# Patient Record
Sex: FEMALE | Race: WHITE
[De-identification: names, ages, dates, MRNs, and addresses within clinical notes are randomized per-mention and may not be internally consistent; named-entity substitution may affect disease eponyms.]

---

## 2019-10-19 ENCOUNTER — HOSPITAL ENCOUNTER (EMERGENCY)
Dept: HOSPITAL 52 - LL.ED | Age: 74
Discharge: HOME | End: 2019-10-19
Payer: MEDICARE

## 2019-10-19 DIAGNOSIS — F02.80: ICD-10-CM

## 2019-10-19 DIAGNOSIS — E78.00: ICD-10-CM

## 2019-10-19 DIAGNOSIS — G30.9: ICD-10-CM

## 2019-10-19 DIAGNOSIS — E87.6: ICD-10-CM

## 2019-10-19 DIAGNOSIS — E83.42: ICD-10-CM

## 2019-10-19 DIAGNOSIS — Z79.899: ICD-10-CM

## 2019-10-19 DIAGNOSIS — R00.2: Primary | ICD-10-CM

## 2019-10-19 DIAGNOSIS — Z79.82: ICD-10-CM

## 2019-10-19 DIAGNOSIS — I10: ICD-10-CM

## 2019-10-19 DIAGNOSIS — F41.9: ICD-10-CM

## 2019-10-19 DIAGNOSIS — F17.210: ICD-10-CM

## 2019-10-19 LAB
CHLORIDE SERPL-SCNC: 107 MMOL/L (ref 98–107)
SODIUM SERPL-SCNC: 145 MMOL/L (ref 136–145)

## 2019-10-19 PROCEDURE — 93005 ELECTROCARDIOGRAM TRACING: CPT

## 2019-10-19 PROCEDURE — 99285 EMERGENCY DEPT VISIT HI MDM: CPT

## 2019-10-19 PROCEDURE — 85025 COMPLETE CBC W/AUTO DIFF WBC: CPT

## 2019-10-19 PROCEDURE — 71046 X-RAY EXAM CHEST 2 VIEWS: CPT

## 2019-10-19 PROCEDURE — 80053 COMPREHEN METABOLIC PANEL: CPT

## 2019-10-19 PROCEDURE — 36415 COLL VENOUS BLD VENIPUNCTURE: CPT

## 2019-10-19 PROCEDURE — 84484 ASSAY OF TROPONIN QUANT: CPT

## 2019-10-19 PROCEDURE — 85379 FIBRIN DEGRADATION QUANT: CPT

## 2019-10-19 PROCEDURE — 83880 ASSAY OF NATRIURETIC PEPTIDE: CPT

## 2019-10-19 PROCEDURE — 36000 PLACE NEEDLE IN VEIN: CPT

## 2019-10-19 PROCEDURE — 83735 ASSAY OF MAGNESIUM: CPT

## 2019-10-19 NOTE — EDM.PDOC
ED HPI GENERAL MEDICAL PROBLEM





- General


Chief Complaint: Cardiovascular Problem


Stated Complaint: "Feels like my heartbeat is irregular"


Time Seen by Provider: 10/19/19 15:04


Source of Information: Reports: Patient, Family


History Limitations: Reports: Other (patient has memory impairment)





- History of Present Illness


INITIAL COMMENTS - FREE TEXT/NARRATIVE: 





Patient complained to  today that she was not feeling well.  No specific 

complaints other than she felt that her heart was pounding.  She also felt it 

could have been a little fast at one point.  observed that patient may 

have been a bit more out of breath when running around house, but otherwise no 

other complaints/symptoms. 





Patient denies other changes.  No recent med changes.  No new supplements. She 

has been feeling well up until episode.  She now feels improved once in the ER. 





- Related Data


 Allergies











Allergy/AdvReac Type Severity Reaction Status Date / Time


 


No Known Allergies Allergy   Verified 10/19/19 15:48











Home Meds: 


 Home Meds





Ascorbic Acid [Vitamin C] 500 mg PO Q2D 10/19/19 [History]


Aspirin [Halfprin] 81 mg PO DAILY 10/19/19 [History]


Calcium Carbonate [Calcium] 1 tab PO DAILY 10/19/19 [History]


Cyanocobalamin (Vitamin B12) [Vitamin B12] 500 mcg PO DAILY 10/19/19 [History]


Donepezil HCl [Aricept] 0.5 tab PO BID 10/19/19 [History]


FLUoxetine [PROzac] 40 mg PO DAILY 10/19/19 [History]


Ferrous Sulfate 0.5 tab PO Q2D 10/19/19 [History]


Lisinopril 40 mg PO DAILY 10/19/19 [History]


Potassium Chloride 1 tab PO DAILY 10/19/19 [History]


Simvastatin 20 mg PO BEDTIME 10/19/19 [History]


hydroCHLOROthiazide [Hydrochlorothiazide] 12.5 mg PO DAILY 10/19/19 [History]











Past Medical History


Cardiovascular History: Reports: High Cholesterol, Hypertension


Neurological History: Reports: Alzheimers Disease


Psychiatric History: Reports: Anxiety





Social & Family History





- Tobacco Use


Smoking Status *Q: Current Every Day Smoker


Years of Tobacco use: 60


Packs/Tins Daily: 1





- Caffeine Use


Caffeine Use: Reports: Coffee





- Recreational Drug Use


Recreational Drug Use: No





ED ROS GENERAL





- Review of Systems


Review Of Systems: See Below


Constitutional: Reports: No Symptoms


HEENT: Reports: No Symptoms


Respiratory: Reports: No Symptoms


Cardiovascular: Reports: Palpitations.  Denies: Chest Pain, Edema, 

Lightheadedness, Syncope


GI/Abdominal: Reports: No Symptoms


: Reports: No Symptoms


Musculoskeletal: Reports: No Symptoms


Skin: Reports: No Symptoms


Neurological: Reports: No Symptoms


Psychiatric: Reports: No Symptoms


Hematologic/Lymphatic: Reports: No Symptoms





ED EXAM, GENERAL





- Physical Exam


Exam: See Below


Exam Limited By: No Limitations


General Appearance: Alert, WD/WN, No Apparent Distress


Eye Exam: Bilateral Eye: EOMI, PERRL


Ears: Normal External Exam


Nose: Normal Inspection


Throat/Mouth: Normal Inspection


Head: Atraumatic, Normocephalic


Neck: Normal Inspection, Supple, Non-Tender, Full Range of Motion.  No: Carotid 

Bruit


Respiratory/Chest: No Respiratory Distress, Lungs Clear, Normal Breath Sounds, 

No Accessory Muscle Use


Cardiovascular: Normal Peripheral Pulses, Regular Rate, Rhythm, No Edema, No 

Murmur


GI/Abdominal: Normal Bowel Sounds, Soft, Non-Tender, No Distention, No Abnormal 

Bruit, No Mass


 (Female) Exam: Deferred


Rectal (Female) Exam: Deferred


Back Exam: Normal Inspection


Extremities: Normal Inspection, Non-Tender, Normal Capillary Refill


Neurological: Alert, Oriented, No Motor/Sensory Deficits


Psychiatric: Normal Affect, Normal Mood


Skin Exam: Warm, Dry, Intact, Normal Color





EKG INTERPRETATION


EKG Date: 10/19/19


Time: 15:11


Rhythm: NSR


Rate (Beats/Min): 62


Axis: Normal


P-Wave: Present


QRS: Normal


ST-T: Other (Flattened appearance of T wave anterior leads)


QT: Normal


Comparison: NA - No Prior EKG





Course





- Vital Signs


Last Recorded V/S: 


 Last Vital Signs











Temp  36.8 C   10/19/19 15:50


 


Pulse  67   10/19/19 16:12


 


Resp  20   10/19/19 16:12


 


BP  136/97 H  10/19/19 16:12


 


Pulse Ox  97   10/19/19 16:12














- Orders/Labs/Meds


Orders: 


 Active Orders 24 hr











 Category Date Time Status


 


 EKG Documentation Completion [RC] ASDIRECTED Care  10/19/19 15:05 Active


 


 Chest 2V [CR] Stat Exams  10/19/19 15:05 Taken


 


 UA W/MICROSCOPIC [URIN] Stat Lab  10/19/19 15:04 Ordered


 


 EKG 12 Lead [EK] Stat Ther  10/19/19 15:05 Ordered











Labs: 


 Laboratory Tests











  10/19/19 10/19/19 10/19/19 Range/Units





  15:15 15:15 15:15 


 


WBC  5.0    (4.0-10.2)  K/uL


 


RBC  4.71    (3.77-5.09)  M/uL


 


Hgb  13.4    (11.7-15.5)  g/dL


 


Hct  41.2    (34.0-46.0)  %


 


MCV  87.5    (84.0-98.0)  fL


 


MCH  28.5    (28.2-33.3)  pg


 


MCHC  32.5    (31.7-36.0)  g/dL


 


RDW  14.8 H    (11.2-14.1)  %


 


Plt Count  202    (150-350)  K/uL


 


Neut % (Auto)  67.8    (45.0-80.0)  %


 


Lymph % (Auto)  15.2    (10.0-50.0)  %


 


Mono % (Auto)  13.0    (2.0-14.0)  %


 


Eos % (Auto)  3.2    (0.0-5.0)  %


 


Baso % (Auto)  0.8    (0.0-2.0)  %


 


Neut # (Auto)  3.40    (1.40-7.00)  K/uL


 


Lymph # (Auto)  0.76    (0.50-3.50)  K/uL


 


Mono # (Auto)  0.65    (0.00-1.00)  K/uL


 


Eos # (Auto)  0.16    (0.00-0.50)  K/uL


 


Baso # (Auto)  0.04    (0.00-0.20)  K/uL


 


D-Dimer, Quantitative    368  (0-400)  ng/mL


 


Sodium   145   (136-145)  mmol/L


 


Potassium   3.0 L   (3.5-5.1)  mmol/L


 


Chloride   107   ()  mmol/L


 


Carbon Dioxide   26.0   (21.0-32.0)  mmol/L


 


BUN   13   (7-18)  mg/dL


 


Creatinine   0.82   (0.51-1.17)  mg/dL


 


Est Cr Clr Drug Dosing   TNP   


 


Estimated GFR (MDRD)   > 60   mL/min


 


Glucose   99   ()  mg/dL


 


Calcium   9.4   (8.5-10.1)  mg/dL


 


Magnesium   1.7 L   (1.8-2.4)  mg/dL


 


Total Bilirubin   0.2   (0.2-1.0)  mg/dL


 


AST   30   (15-37)  U/L


 


ALT   36   (12-78)  U/L


 


Alkaline Phosphatase   68   ()  IU/L


 


Troponin I   0.000   (0.000-0.056)  ng/mL


 


NT-Pro-B Natriuret Pep   257 H   (0-125)  pg/mL


 


Total Protein   6.8   (6.4-8.2)  g/dL


 


Albumin   3.4   (3.4-5.0)  g/dL











Meds: 


Medications














Discontinued Medications














Generic Name Dose Route Start Last Admin





  Trade Name Freq  PRN Reason Stop Dose Admin


 


Magnesium Oxide  800 mg  10/19/19 16:17  





  Magnesium Oxide  PO  10/19/19 16:18  





  ONETIME ONE   





     





     





     





     


 


Potassium Chloride  40 meq  10/19/19 16:18  





  Klor-Con M20  PO  10/19/19 16:19  





  ONETIME ONE   





     





     





     





     














- Re-Assessments/Exams


Free Text/Narrative Re-Assessment/Exam: 











Patient placed on monitor,  EKG performed.  Normal sinus rhythm noted. Chest 

xray unremarkable. Cardiac labs drawn and were overall unremarkable except for 

low potassium and magnesium.  





No recurrence of initial complaint throughout ER stay. Patient comfortable.  

Did get anxious when her  left briefly, BP noted to up significantly.  

This improved significantly once  returned. 





Supplemental K and Mg given. Patient wished to go home. Plan for increased 

supplemental K and Mg discussed with patient's . Close follow up with 

primary provider for rechecks.  Precautions reviewed. To follow up as needed if 

symptoms return. 





Departure





- Departure


Time of Disposition: 16:18


Disposition: Home, Self-Care 01


Condition: Good


Clinical Impression: 


 Pounding heartbeat, Hypomagnesemia, Hypokalemia





Instructions:  Hypomagnesemia, Hypokalemia, Palpitations, Easy-to-Read


Referrals: 


Nivia Oliveira PA-C [Primary Care Provider] - 


Forms:  ED Department Discharge


Additional Instructions: 


See if the symptoms return.  If so, get them rechecked.





Increase the potassium to 2 10mg tabs tomorrow morning,  again at noon, and 

again at night. 


Take 2 tabs again on Monday morning.  See if you can get your clinic to recheck 

the potassium level for you on Monday.  If not, return to ER on Tuesday and we 

can do it as an outpatient order. 





Start Magnesium.  Try to get Magnesium Glycinate or Magnesium Taurate and take 

around 500mg daily. This is an over the counter medicine and likely is most 

easily located on an internet search.   You can try Magnesium Oxide, but it is 

not as well absorbed. 





Given that the Potassium level is so low, you will likely need to change to 

20mg daily from the current 10mg and have it rechecked periodically to make 

certain it is still within good range. 





- My Orders


Last 24 Hours: 


My Active Orders





10/19/19 15:04


UA W/MICROSCOPIC [URIN] Stat 





10/19/19 15:05


EKG Documentation Completion [RC] ASDIRECTED 


Chest 2V [CR] Stat 


EKG 12 Lead [EK] Stat 














- Assessment/Plan


Last 24 Hours: 


My Active Orders





10/19/19 15:04


UA W/MICROSCOPIC [URIN] Stat 





10/19/19 15:05


EKG Documentation Completion [RC] ASDIRECTED 


Chest 2V [CR] Stat 


EKG 12 Lead [EK] Stat

## 2020-05-18 ENCOUNTER — HOSPITAL ENCOUNTER (OUTPATIENT)
Dept: HOSPITAL 52 - LL.ED | Age: 75
Setting detail: OBSERVATION
Discharge: HOME | End: 2020-05-18
Attending: EMERGENCY MEDICINE | Admitting: FAMILY MEDICINE
Payer: MEDICARE

## 2020-05-18 DIAGNOSIS — F32.9: ICD-10-CM

## 2020-05-18 DIAGNOSIS — F41.9: ICD-10-CM

## 2020-05-18 DIAGNOSIS — F02.80: ICD-10-CM

## 2020-05-18 DIAGNOSIS — G30.9: ICD-10-CM

## 2020-05-18 DIAGNOSIS — M89.49: ICD-10-CM

## 2020-05-18 DIAGNOSIS — F09: Primary | ICD-10-CM

## 2020-05-18 DIAGNOSIS — E87.6: ICD-10-CM

## 2020-05-18 DIAGNOSIS — E78.5: ICD-10-CM

## 2020-05-18 DIAGNOSIS — Z79.899: ICD-10-CM

## 2020-05-18 DIAGNOSIS — F17.210: ICD-10-CM

## 2020-05-18 DIAGNOSIS — F41.8: ICD-10-CM

## 2020-05-18 DIAGNOSIS — I10: ICD-10-CM

## 2020-05-18 DIAGNOSIS — E83.42: ICD-10-CM

## 2020-05-18 DIAGNOSIS — E78.00: ICD-10-CM

## 2020-05-18 DIAGNOSIS — Z51.5: ICD-10-CM

## 2020-05-18 LAB
CHLORIDE SERPL-SCNC: 110 MMOL/L (ref 98–107)
SODIUM SERPL-SCNC: 148 MMOL/L (ref 136–145)

## 2020-05-18 NOTE — PCM.DCSUM1
**Discharge Summary





- Hospital Course


Brief History: Patient brought to ER via EMS due to increasing behavioral 

issues related to organic brain disease.   having difficult time keeping 

control of patient's behavioral issues.


Diagnosis: Stroke: No





- Discharge Data


Discharge Date: 05/18/20


Discharge Disposition: Home, Self-Care 01


Condition: Good





- Referral to Home Health


Primary Care Physician: 


PCP None








- Discharge Diagnosis/Problem(s)


(1) Organic brain syndrome


SNOMED Code(s): 211206609


   ICD Code: F09 - UNSP MENTAL DISORDER DUE TO KNOWN PHYSIOLOGICAL CONDITION   

Status: Chronic   Priority: High   Current Visit: Yes   Problem Details: 

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 

Her  called EMS due to escalating behavioral problems at home.  Note 

that the patient did receive next dose of Zyprexa at home with no significant 

improvement.   today says that he is not ready to have the patient 

placed.  Can see if switching patient from Zyprexa to Seroquel is more helpful. 

Close follow up with primary provider recommended.   





(2) Comfort measures only status


SNOMED Code(s): 52911760110484


   ICD Code: Z51.5 - ENCOUNTER FOR PALLIATIVE CARE   Status: Chronic   Priority

: High   Current Visit: Yes   Problem Details: NO CODE STATUS confirmed by the 

patient's  by telephone.   





(3) Hyperlipidemia


SNOMED Code(s): 37375876


   ICD Code: E78.5 - HYPERLIPIDEMIA, UNSPECIFIED   Status: Chronic   Priority: 

Medium   Current Visit: Yes   Problem Details: Currently under therapy. 

Consider discontinuation of her Zocor in the future secondary to her aggressive 

organic brain syndrome.   


Qualifiers: 


   Hyperlipidemia type: unspecified   Qualified Code(s): E78.5 - Hyperlipidemia

, unspecified   





(4) Hypertension


SNOMED Code(s): 33185143


   ICD Code: I10 - ESSENTIAL (PRIMARY) HYPERTENSION   Status: Chronic   Priority

: High   Current Visit: Yes   Problem Details: Variable blood pressures both by 

the EMTs and also during emergency room care secondary to patient's agitation. 

Currently running 160s/170s/70s. Follow up with primary provider.     


Qualifiers: 


   Hypertension type: essential hypertension   Qualified Code(s): I10 - 

Essential (primary) hypertension   





(5) Hypokalemia


SNOMED Code(s): 29946073


   ICD Code: E87.6 - HYPOKALEMIA   Status: Chronic   Priority: Medium   Current 

Visit: Yes   Onset Date: 10/19/19   Problem Details: Per medical records. 

Currently under therapy. 3.2 today.  Additional 40meq K ordered. To follow up 

with primary provider for continued monitoring.    





(6) Hypomagnesemia


SNOMED Code(s): 514663601


   ICD Code: E83.42 - HYPOMAGNESEMIA   Status: Chronic   Priority: Medium   

Current Visit: Yes   Onset Date: 10/19/19   Problem Details: Not currently 

under therapy. Within normal level today.    





(7) Mixed anxiety depressive disorder


SNOMED Code(s): 701251935


   ICD Code: F41.8 - OTHER SPECIFIED ANXIETY DISORDERS   Status: Chronic   

Priority: Medium   Current Visit: Yes   Problem Details: Stable by limited 

history. Continue current medical therapy for now.   





(8) Osteoarthritis


SNOMED Code(s): 026221273


   ICD Code: M19.90 - UNSPECIFIED OSTEOARTHRITIS, UNSPECIFIED SITE   Status: 

Chronic   Priority: Medium   Current Visit: Yes   Problem Details: Stable by 

history   


Qualifiers: 


   Osteoarthritis location: multiple joints   Osteoarthritis type: primary   

Qualified Code(s): M89.49 - Other hypertrophic osteoarthropathy, multiple sites

   





- Patient Summary/Data


Consults: 


 Consultations





05/18/20 01:07


Consult to Case Management/ [CONS] Routine 











Hospital Course: 





Unremarkable hospital course.  Has been observed trying to escape from room.  

Pleasantly confused.  No aggressive behavior noted.  wishes to have 

patient return home and tells us that he is not ready to place her in nursing 

home/memory care at this time. OK to discharge home. Will see if switching 

Zyprexa to Seroquel offers any benefit for the behavioral concerns. Close 

follow up with primary provider recommended. 





- Patient Instructions


Diet: Usual Diet as Tolerated


Activity: As Tolerated


Driving: Do Not Drive


Showering/Bathing: May Shower


Other/Special Instructions: We will see if Seroquel is more helpful than 

Zyprexa for behavioral concerns.  Please follow up closely with your primary 

provider for additional medication adjustments. Follow up otherwise as needed 

if you have problems/concerns.





- Discharge Plan


*PRESCRIPTION DRUG MONITORING PROGRAM REVIEWED*: Not Applicable


*COPY OF PRESCRIPTION DRUG MONITORING REPORT IN PATIENT GERA: Not Applicable


Prescriptions/Med Rec: 


QUEtiapine [SEROquel] 25 mg PO BID #60 tablet


Home Medications: 


 Home Meds





Ascorbic Acid [Vitamin C] 500 mg PO Q2D 10/19/19 [History]


Calcium Carbonate [Calcium] 1 tab PO DAILY 10/19/19 [History]


Donepezil HCl [Aricept] 0.5 tab PO BID 10/19/19 [History]


FLUoxetine [PROzac] 40 mg PO DAILY 10/19/19 [History]


Ferrous Sulfate 0.5 tab PO Q2D 10/19/19 [History]


Potassium Chloride 1 tab PO DAILY 10/19/19 [History]


Simvastatin 20 mg PO BEDTIME 10/19/19 [History]


hydroCHLOROthiazide [Hydrochlorothiazide] 12.5 mg PO DAILY 10/19/19 [History]


lisinopriL [Lisinopril] 40 mg PO DAILY 10/19/19 [History]


QUEtiapine [SEROquel] 25 mg PO BID #60 tablet 05/18/20 [Rx]


busPIRone HCl [Buspirone HCl] 15 mg PO TID 05/18/20 [History]








Forms:  ED Department Discharge


Referrals: 


PCP,None [Primary Care Provider] - 





- Discharge Summary/Plan Comment


DC Time >30 min.: No





- General Info


Date of Service: 05/18/20


Admission Dx/Problem (Free Text: 





Admitted after experiencing escalating behavioral issues/concerns at home.  

 called EMS/patient wanting to leave house.  Dementia/cognitive decline. 


Subjective Update: 





Patient feels fine. Grumpy.  Wants a cigarette.  Has not concerns and does not 

know where she is. 


Numeric/FACES Score: 0





- Review of Systems


General: Reports: No Symptoms


HEENT: Reports: No Symptoms


Pulmonary: Reports: No Symptoms


Cardiovascular: Reports: No Symptoms


Gastrointestinal: Reports: No Symptoms


Genitourinary: Reports: No Symptoms


Musculoskeletal: Reports: No Symptoms


Neurological: Reports: Confusion


Psychiatric: Reports: Confusion





- Patient Data


Vitals - Most Recent: 


 Last Vital Signs











Temp  36.5 C   05/18/20 10:00


 


Pulse  67   05/18/20 10:00


 


Resp  18   05/18/20 10:00


 


BP  172/72 H  05/18/20 10:05


 


Pulse Ox  96   05/18/20 10:00











Weight - Most Recent: 54.431 kg


I&O - Last 24 hours: 


 Intake & Output











 05/17/20 05/18/20 05/18/20





 22:59 06:59 14:59


 


Intake Total  0 


 


Output Total  0 


 


Balance  0 











Lab Results - Last 24 hrs: 


 Laboratory Results - last 24 hr











  05/18/20 05/18/20 05/18/20 Range/Units





  07:58 07:58 07:58 


 


WBC  4.5    (4.0-10.2)  K/uL


 


RBC  3.84    (3.77-5.09)  M/uL


 


Hgb  10.8 L D    (11.7-15.5)  g/dL


 


Hct  34.4    (34.0-46.0)  %


 


MCV  89.6    (84.0-98.0)  fL


 


MCH  28.1 L    (28.2-33.3)  pg


 


MCHC  31.4 L    (31.7-36.0)  g/dL


 


RDW  14.4 H    (11.2-14.1)  %


 


Plt Count  187    (150-350)  K/uL


 


Neut % (Auto)  53.9    (45.0-80.0)  %


 


Lymph % (Auto)  26.2    (10.0-50.0)  %


 


Mono % (Auto)  16.2 H    (2.0-14.0)  %


 


Eos % (Auto)  3.3    (0.0-5.0)  %


 


Baso % (Auto)  0.4    (0.0-2.0)  %


 


Neut # (Auto)  2.42    (1.40-7.00)  K/uL


 


Lymph # (Auto)  1.18    (0.50-3.50)  K/uL


 


Mono # (Auto)  0.73    (0.00-1.00)  K/uL


 


Eos # (Auto)  0.15    (0.00-0.50)  K/uL


 


Baso # (Auto)  0.02    (0.00-0.20)  K/uL


 


Sodium   148 H   (136-145)  mmol/L


 


Potassium   3.2 L   (3.5-5.1)  mmol/L


 


Chloride   110 H   ()  mmol/L


 


Carbon Dioxide   26.7   (21.0-32.0)  mmol/L


 


BUN   20 H   (7-18)  mg/dL


 


Creatinine   0.83   (0.51-1.17)  mg/dL


 


Est Cr Clr Drug Dosing   41.42   mL/min


 


Estimated GFR (MDRD)   > 60   mL/min


 


Glucose   88   ()  mg/dL


 


Calcium   8.7   (8.5-10.1)  mg/dL


 


Magnesium   1.9   (1.8-2.4)  mg/dL


 


Iron    20 L  ()  ug/dL


 


TIBC    246 L  (250-450)  ug/dL


 


% Saturation    8.32217  


 


Ferritin    31  (8-388)  ng/mL


 


Total Bilirubin   0.2   (0.2-1.0)  mg/dL


 


AST   26   (15-37)  U/L


 


ALT   25   (12-78)  U/L


 


Alkaline Phosphatase   60   ()  IU/L


 


Total Protein   6.0 L   (6.4-8.2)  g/dL


 


Albumin   2.9 L   (3.4-5.0)  g/dL


 


Vitamin B12   974   (193-986)  pg/mL


 


Folate   20.6   (8.6-58.9)  ng/mL


 


TSH, Ultra Sensitive   0.961   (0.358-3.740)  mIU/mL











Med Orders - Current: 


 Current Medications





Acetaminophen (Tylenol)  650 mg PO Q4H PRN


   PRN Reason: Pain


Buspirone HCl (Buspar)  15 mg PO TID [facility]


   Last Admin: 05/18/20 10:07 Dose:  15 mg


Donepezil HCl (Aricept)  5 mg PO BID [facility]


   Last Admin: 05/18/20 10:06 Dose:  5 mg


Fluoxetine HCl (Prozac)  40 mg PO DAILY [facility]


   Last Admin: 05/18/20 10:07 Dose:  40 mg


Haloperidol Lactate (Haldol)  5 mg IM Q12HR PRN


   PRN Reason: Agitation


Hydrochlorothiazide (Hydrochlorothiazide)  12.5 mg PO DAILY [facility]


   Last Admin: 05/18/20 10:05 Dose:  12.5 mg


Lisinopril (Prinivil)  40 mg PO DAILY [facility]


   Last Admin: 05/18/20 10:05 Dose:  40 mg


Nicotine (Habitrol)  21 mg TRDERM QPM [facility]


   Last Admin: 05/18/20 01:39 Dose:  21 mg


Potassium Chloride (Klor-Con 10)  10 meq PO DAILY [facility]


   Last Admin: 05/18/20 10:08 Dose:  10 meq


Quetiapine Fumarate (Seroquel)  25 mg PO BID [facility]


   Last Admin: 05/18/20 10:08 Dose:  25 mg


Simvastatin (Zocor)  20 mg PO BEDTIME [facility]


Temazepam (Restoril)  15 mg PO DAILY@2000 PRN


   PRN Reason: Insomnia


   Last Admin: 05/18/20 01:39 Dose:  15 mg





Discontinued Medications





Potassium Chloride (Klor-Con M20)  40 meq PO ONETIME ONE


   Stop: 05/18/20 10:42











- Exam


General: Reports: Alert


HEENT: Reports: Pupils Equal, Pupils Reactive, EOMI, Mucous Membr. Moist/Pink


Neck: Reports: Supple


Lungs: Reports: Clear to Auscultation, Normal Respiratory Effort


Cardiovascular: Reports: Regular Rate, Regular Rhythm


GI/Abdominal Exam: Soft, Non-Tender


 (Female) Exam: Deferred


Rectal (Female) Exam: Deferred


Back Exam: Denies: Muscle Spasm


Extremities: Non-Tender, Normal Capillary Refill


Skin: Reports: Warm, Dry


Neurological: Reports: No New Focal Deficit


Psy/Mental Status: Reports: Alert, Agitated (mild.)

## 2021-10-04 NOTE — EDM.PDOC
ED HPI GENERAL MEDICAL PROBLEM





- General


Chief Complaint: Lower Extremity Injury/Pain


Stated Complaint: fall, left hip pain


Time Seen by Provider: 10/04/21 18:10


Source of Information: Reports: Nursing Home Records





- History of Present Illness


INITIAL COMMENTS - FREE TEXT/NARRATIVE: 





Tonya is a 76 y/o female who is brought to the ER from [facility]. 

Approximately 3:30 pm today, it was reported that she had gotetn up on her own 

and then in the process of trying to get herself in the wheelchair she fell. 

Staff reported a large bruise on the upper left thigh and she seems quite 

agitated. She does have dementia and she is unable to tell staff what's going 

on.





- Related Data


                                    Allergies











Allergy/AdvReac Type Severity Reaction Status Date / Time


 


No Known Allergies Allergy   Verified 10/04/21 19:12











Home Meds: 


                                    Home Meds





Donepezil HCl [Aricept] 0.5 tab PO BID 10/19/19 [History]


FLUoxetine [PROzac] 30 mg PO DAILY 10/19/19 [History]


Ferrous Sulfate 0.5 tab PO Q2D 10/19/19 [History]


hydroCHLOROthiazide [Hydrochlorothiazide] 12.5 mg PO DAILY 10/19/19 [History]


lisinopriL [Lisinopril] 40 mg PO DAILY 10/19/19 [History]


busPIRone HCl [Buspirone HCl] 15 mg PO TID 05/18/20 [History]


Acetaminophen [Tylenol 8 Hour] 1 tab PO BID 10/04/21 [History]


Aspirin [Aspirin EC] 1 tab PO DAILY 10/04/21 [History]


Camphor/Menthol [Sarna Lotion] 1 applic TOP BID 10/04/21 [History]


Cyanocobalamin (Vitamin B12) [Vitamin B12] 2 tab PO DAILY 10/04/21 [History]


OLANZapine [ZyPREXA] 1 tab PO DAILY 10/04/21 [History]


OLANZapine [ZyPREXA] 7.5 mg PO QPM 10/04/21 [History]


Potassium Chloride [K-Tab ER] 1 tab PO BID 10/04/21 [History]


busPIRone HCl [Buspirone HCl] 1 tab PO TID 10/04/21 [History]


hydrOXYzine HCL [hydrOXYzine] 0.5 tab PO Q12HR PRN 10/04/21 [History]


traZODone HCl [Trazodone HCl] 12.5 mg PO QAM 10/04/21 [History]


traZODone HCl [Trazodone HCl] 50 mg PO QPM 10/04/21 [History]











Past Medical History


HEENT History: Reports: Hard of Hearing, Impaired Vision


Other HEENT History: Presbycusis with previous bilateral hearing aides. Patient 

does have a lower partial denture. She wears glasses.


Cardiovascular History: Reports: High Cholesterol, Hypertension


Neurological History: Reports: Alzheimers Disease


Psychiatric History: Reports: Alzheimers Disease, Anxiety, Dementia, Depression


Endocrine/Metabolic History: Reports: Hypokalemia, Hypomagnesemia


Hematologic History: Reports: B12 Deficiency





- Past Surgical History


HEENT Surgical History: Reports: Oral Surgery, Other (See Below)


Other HEENT Surgeries/Procedures: Multiple teeth extractions





Social & Family History





- Tobacco Use


Tobacco Use Status *Q: Unknown Ever Used Tobacco


Second Hand Smoke Exposure: No





- Caffeine Use


Caffeine Use: Reports: None





- Recreational Drug Use


Recreational Drug Use: No





- Living Situation & Occupation


Living situation: Reports: with Family ()





Review of Systems





- Review of Systems


Review Of Systems: Unable To Obtain


Reason Not Obtained: Has Dementia, unable to complete ROS





ED EXAM, GENERAL





- Physical Exam


Exam: See Below


General Appearance: Alert, WD/WN, Other (Elderly female, she does get agitated 

and is uncooperative with the nurse and CNP.)


Eye Exam: Bilateral Eye: PERRL


Ears: Normal External Exam, Hearing Grossly Normal


Nose: Normal Inspection, Normal Mucosa


Throat/Mouth: Normal Inspection, Normal Lips, Normal Oropharynx


Head: Atraumatic, Normocephalic


Neck: Supple


Respiratory/Chest: No Respiratory Distress, Lungs Clear, Chest Non-Tender


Cardiovascular: Regular Rate, Rhythm


GI/Abdominal: Normal Bowel Sounds, Soft, Non-Tender


 (Female) Exam: Deferred


Rectal (Female) Exam: Deferred


Back Exam: Normal Inspection


Extremities: Normal Capillary Refill, Other (Note large bruise to anterior left 

thigh region, seems to be in pain with ROM, but patient moving it on her own)


Neurological: Alert, Confused


Skin Exam: Warm, Dry, Normal Color


  ** #1 Interpretation


EKG Date: 10/04/21


Time: 18:43


Rhythm: NSR


Rate (Beats/Min): 88


Axis: Normal


P-Wave: Present


QRS: Normal


ST-T: Normal


QT: Normal


Comparison: No Change





Course





- Vital Signs


Text/Narrative:: 





Patient was seen on arrival to the ER. Labs and Xrays ordered.





Xray of Pelvis and Left Thigh are negative.





Patient getting very combative and not wanting to stay in bed. Labs reviewed. 

WBC mildly elevated, diff negative, suspect related to fall. CMP neg. Trpo neg. 

Will discharge back to the nursing home and have staff there obtain a UA.





Patient discharged back to [facility].


Last Recorded V/S: 


                                Last Vital Signs











Temp  37.0 C   10/04/21 18:08


 


Pulse  86   10/04/21 18:08


 


Resp  20   10/04/21 18:08


 


BP  148/61 H  10/04/21 18:08


 


Pulse Ox  94 L  10/04/21 18:08














- Orders/Labs/Meds


Orders: 


                               Active Orders 24 hr











 Category Date Time Status


 


 Chest 1V Frontal [CR] Stat Exams  10/04/21 18:10 Taken


 


 Pelvis 1V or 2V [CR] Stat Exams  10/04/21 18:11 Ordered


 


 UA RFX SUJIT AND CULT IF INDIC [URIN] Stat Lab  10/04/21 18:10 Ordered


 


 Sodium Chloride 0.9% [Saline Flush] Med  10/04/21 18:11 Active





 10 ml FLUSH ASDIRECTED PRN   


 


 Saline Lock Insert [OM.PC] Stat Oth  10/04/21 18:10 Ordered








                                Medication Orders





Sodium Chloride (Sodium Chloride 0.9% 10 Ml Syringe)  10 ml FLUSH ASDIRECTED PRN


   PRN Reason: Keep Vein Open








Labs: 


                                Laboratory Tests











  10/04/21 10/04/21 Range/Units





  18:22 18:22 


 


WBC  11.8 H   (4.0-10.2)  K/uL


 


RBC  3.49 L   (3.77-5.09)  M/uL


 


Hgb  9.3 L   (11.7-15.5)  g/dL


 


Hct  30.7 L   (34.0-46.0)  %


 


MCV  88.0   (84.0-98.0)  fL


 


MCH  26.6 L   (28.2-33.3)  pg


 


MCHC  30.3 L   (31.7-36.0)  g/dL


 


RDW  17.2 H   (11.2-14.1)  %


 


Plt Count  353 H   (150-350)  K/uL


 


Neut % (Auto)  66.3   (45.0-80.0)  %


 


Lymph % (Auto)  17.2   (10.0-50.0)  %


 


Mono % (Auto)  10.0   (2.0-14.0)  %


 


Eos % (Auto)  6.0 H   (0.0-5.0)  %


 


Baso % (Auto)  0.5   (0.0-2.0)  %


 


Neut # (Auto)  7.79 H   (1.40-7.00)  K/uL


 


Lymph # (Auto)  2.02   (0.50-3.50)  K/uL


 


Mono # (Auto)  1.18 H   (0.00-1.00)  K/uL


 


Eos # (Auto)  0.71 H   (0.00-0.50)  K/uL


 


Baso # (Auto)  0.06   (0.00-0.20)  K/uL


 


Sodium   145  (136-145)  mmol/L


 


Potassium   3.9  (3.5-5.1)  mmol/L


 


Chloride   111 H  ()  mmol/L


 


Carbon Dioxide   27.9  (21.0-32.0)  mmol/L


 


Anion Gap   10.0  (7-15)  meq/L


 


BUN   20 H  (7-18)  mg/dL


 


Creatinine   0.96  (0.51-1.17)  mg/dL


 


Est Cr Clr Drug Dosing   38.81  mL/min


 


Estimated GFR (MDRD)   56  mL/min


 


Glucose   120 H  (70-99)  mg/dL


 


Calcium   8.6  (8.5-10.1)  mg/dL


 


Magnesium   2.1  (1.8-2.4)  mg/dL


 


Total Bilirubin   0.4  (0.2-1.0)  mg/dL


 


AST   17  (15-37)  U/L


 


ALT   28  (12-78)  U/L


 


Alkaline Phosphatase   73  ()  IU/L


 


Troponin I High Sens   9  (<=51)  ng/L


 


Total Protein   6.3 L  (6.4-8.2)  g/dL


 


Albumin   3.1 L  (3.4-5.0)  g/dL











Meds: 


Medications











Generic Name Dose Route Start Last Admin





  Trade Name Freq  PRN Reason Stop Dose Admin


 


Sodium Chloride  10 ml  10/04/21 18:11 





  Sodium Chloride 0.9% 10 Ml Syringe  FLUSH  





  ASDIRECTED PRN  





  Keep Vein Open  














- Radiology Interpretation


Free Text/Narrative:: 





XR Pelvis/Left Thigh no acute fx noted (see final report)





XR Chest 1V=neg (See final report)





Departure





- Departure


Time of Disposition: 19:24


Disposition: DC/Tfer to [facility]


Condition: Good


Clinical Impression: 


Fall


Qualifiers:


 Encounter type: initial encounter Qualified Code(s): W19.XXXA - Unspecified 

fall, initial encounter





Contusion of left thigh


Qualifiers:


 Encounter type: initial encounter Qualified Code(s): S70.12XA - Contusion of 

left thigh, initial encounter








- Discharge Information


Instructions:  Pain Medicine Instructions, Easy-to-Read


Referrals: 


Sarita Clark MD [Primary Care Provider] - 


Forms:  ED Department Discharge


Additional Instructions: 


-Pain meds as needed. Resume all meds at nursing home.





-Please obtain UA when able at nursing home.





-Xrays were negative for any fx tonight.





-Please have pt seen for FU by PCP





-Return as needed to ER





Sepsis Event Note (ED)





- Evaluation


Sepsis Screening Result: No Definite Risk





- Focused Exam


Vital Signs: 


                                   Vital Signs











  Temp Pulse Resp BP Pulse Ox


 


 10/04/21 18:08  37.0 C  86  20  148/61 H  94 L














- My Orders


Last 24 Hours: 


My Active Orders





10/04/21 18:10


Chest 1V Frontal [CR] Stat 


UA RFX SUJIT AND CULT IF INDIC [URIN] Stat 


Saline Lock Insert [OM.PC] Stat 





10/04/21 18:11


Pelvis 1V or 2V [CR] Stat 


Sodium Chloride 0.9% [Saline Flush]   10 ml FLUSH ASDIRECTED PRN 














- Assessment/Plan


Last 24 Hours: 


My Active Orders





10/04/21 18:10


Chest 1V Frontal [CR] Stat 


UA RFX SUJIT AND CULT IF INDIC [URIN] Stat 


Saline Lock Insert [OM.PC] Stat 





10/04/21 18:11


Pelvis 1V or 2V [CR] Stat 


Sodium Chloride 0.9% [Saline Flush]   10 ml FLUSH ASDIRECTED PRN